# Patient Record
Sex: MALE | Race: WHITE | NOT HISPANIC OR LATINO | URBAN - METROPOLITAN AREA
[De-identification: names, ages, dates, MRNs, and addresses within clinical notes are randomized per-mention and may not be internally consistent; named-entity substitution may affect disease eponyms.]

---

## 2023-04-28 ENCOUNTER — APPOINTMENT (RX ONLY)
Dept: URBAN - METROPOLITAN AREA CLINIC 46 | Facility: CLINIC | Age: 33
Setting detail: DERMATOLOGY
End: 2023-04-28

## 2023-04-28 DIAGNOSIS — L20.89 OTHER ATOPIC DERMATITIS: ICD-10-CM

## 2023-04-28 PROBLEM — L30.9 DERMATITIS, UNSPECIFIED: Status: ACTIVE | Noted: 2023-04-28

## 2023-04-28 PROCEDURE — ? TREATMENT REGIMEN

## 2023-04-28 PROCEDURE — ? COUNSELING

## 2023-04-28 PROCEDURE — ? PRESCRIPTION

## 2023-04-28 PROCEDURE — 99203 OFFICE O/P NEW LOW 30 MIN: CPT

## 2023-04-28 RX ORDER — MOMETASONE FUROATE 1 MG/G
1 OINTMENT TOPICAL BID
Qty: 45 | Refills: 1 | Status: ERX | COMMUNITY
Start: 2023-04-28

## 2023-04-28 RX ADMIN — MOMETASONE FUROATE 1: 1 OINTMENT TOPICAL at 00:00

## 2023-04-28 ASSESSMENT — LOCATION SIMPLE DESCRIPTION DERM
LOCATION SIMPLE: RIGHT FOREARM
LOCATION SIMPLE: LEFT ELBOW

## 2023-04-28 ASSESSMENT — LOCATION DETAILED DESCRIPTION DERM
LOCATION DETAILED: LEFT ELBOW
LOCATION DETAILED: RIGHT PROXIMAL RADIAL DORSAL FOREARM

## 2023-04-28 ASSESSMENT — LOCATION ZONE DERM: LOCATION ZONE: ARM

## 2023-04-28 NOTE — PROCEDURE: TREATMENT REGIMEN
Discontinue Regimen: Triamcinolone ointment - thin layer up to twice daily x 2 weeks per month as needed \\nClobetasol ointment -  thin layer up to twice daily x 2 weeks per month as needed \\n\\nONLY USE THESE 2 WHEN FLARES ARE BAD.
Plan: Patient would like to schedule a patch testing for his own reasoning.  Will send a task to [facility].
Detail Level: Zone
Initiate Treatment: Mometasone ointment Apply a thin layer to affected areas on arms twice daily for 2 weeks then stop x 1 month. May repeat as needed for flares.

## 2023-07-24 ENCOUNTER — APPOINTMENT (RX ONLY)
Dept: URBAN - METROPOLITAN AREA CLINIC 46 | Facility: CLINIC | Age: 33
Setting detail: DERMATOLOGY
End: 2023-07-24

## 2023-07-24 DIAGNOSIS — L259 CONTACT DERMATITIS AND OTHER ECZEMA, UNSPECIFIED CAUSE: ICD-10-CM

## 2023-07-24 PROBLEM — L23.9 ALLERGIC CONTACT DERMATITIS, UNSPECIFIED CAUSE: Status: ACTIVE | Noted: 2023-07-24

## 2023-07-24 PROCEDURE — ? ADDITIONAL NOTES

## 2023-07-24 PROCEDURE — 95044 PATCH/APPLICATION TESTS: CPT

## 2023-07-24 PROCEDURE — ? COUNSELING

## 2023-07-24 PROCEDURE — ? PATCH TESTING

## 2023-07-24 ASSESSMENT — LOCATION SIMPLE DESCRIPTION DERM
LOCATION SIMPLE: UPPER BACK
LOCATION SIMPLE: LEFT UPPER BACK

## 2023-07-24 ASSESSMENT — SEVERITY ASSESSMENT: SEVERITY: MILD

## 2023-07-24 ASSESSMENT — LOCATION DETAILED DESCRIPTION DERM
LOCATION DETAILED: LEFT INFERIOR MEDIAL UPPER BACK
LOCATION DETAILED: INFERIOR THORACIC SPINE

## 2023-07-24 ASSESSMENT — LOCATION ZONE DERM: LOCATION ZONE: TRUNK

## 2023-07-24 NOTE — HPI: TESTING (PATCH TESTING)
Has Your Rash Been Biopsied Before?: has not been biopsied previously
Have You Had Previous Patch Testing In The Past?: none
How Severe Is Your Rash At Its Worst?: mild
Who Is Your Referring Doctor?: Yvonne
Additional History: Patient is here for patch testing.

## 2023-07-24 NOTE — PROCEDURE: PATCH TESTING
Detail Level: None
Post-Care Instructions: I reviewed with the patient in detail post-care instructions. Patient should not sweat, pick at, or get the patches wet for 48 hours.
Consent obtained, risks reviewed including but not limited to rash, itching, allergic reaction, systemic rash, remote possiblity of anaphylaxis to allergen.
Number Of Patches (Maximum Allowable Per Dos By Cms Is 90): 79

## 2023-07-24 NOTE — PROCEDURE: ADDITIONAL NOTES
Render Risk Assessment In Note?: no
Additional Notes: Patient notes recurrent eczematous patches on medial arms and forearms.  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  He also notices flare in this condition when he eats any dairy, so he tries to avoid dairy.\\n\\nDiscussed patch testing and prick testing in detail.  I advise him to see an allergist if food allergy is something he is concerned about, as that is not specifically tested on patch testing.
Detail Level: Simple

## 2023-07-26 ENCOUNTER — APPOINTMENT (RX ONLY)
Dept: URBAN - METROPOLITAN AREA CLINIC 46 | Facility: CLINIC | Age: 33
Setting detail: DERMATOLOGY
End: 2023-07-26

## 2023-07-26 DIAGNOSIS — L259 CONTACT DERMATITIS AND OTHER ECZEMA, UNSPECIFIED CAUSE: ICD-10-CM

## 2023-07-26 PROBLEM — L23.9 ALLERGIC CONTACT DERMATITIS, UNSPECIFIED CAUSE: Status: ACTIVE | Noted: 2023-07-26

## 2023-07-26 PROCEDURE — 99024 POSTOP FOLLOW-UP VISIT: CPT

## 2023-07-26 PROCEDURE — ? ADDITIONAL NOTES

## 2023-07-26 PROCEDURE — ? COUNSELING

## 2023-07-26 PROCEDURE — ? PATCH TEST REMOVAL

## 2023-07-26 PROCEDURE — ? NORTH AMERICAN 80 PATCH TEST READING

## 2023-07-26 ASSESSMENT — LOCATION ZONE DERM: LOCATION ZONE: TRUNK

## 2023-07-26 ASSESSMENT — LOCATION DETAILED DESCRIPTION DERM: LOCATION DETAILED: LEFT INFERIOR MEDIAL UPPER BACK

## 2023-07-26 ASSESSMENT — LOCATION SIMPLE DESCRIPTION DERM: LOCATION SIMPLE: LEFT UPPER BACK

## 2023-07-26 NOTE — HPI: TESTING (PATCH TESTING READING, DISCUSSION OF RESULTS)
How Severe Is Your Rash?: mild
What Patch Testing Have You Undergone?: [employer] 80 Series
What Reading Is This?: 48 hour patch test reading
Additional History: Patient is here to have patches removed today.

## 2023-07-26 NOTE — PROCEDURE: ADDITIONAL NOTES
Render Risk Assessment In Note?: no
Additional Notes: Patient notes recurrent eczematous patches on medial arms and forearms.  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  He also notices flare in this condition when he eats any dairy, so he tries to avoid dairy.\\n\\n(+) PPD today.  This  may be relevant if cardboard he contacts has dark ink on it.  Reviewed handout on PPD with patient today.
Detail Level: Simple

## 2023-07-26 NOTE — PROCEDURE: [employer] 80 PATCH TEST READING
Allergen 80 Reaction: no reaction
Name Of Allergen 62: Polysorbate 80
Name Of Allergen 15: 8-jhgi-gkjgxhgxyxqgcultwctuwi resin
Name Of Allergen 27: Mehtyldibromo glutaronitrile
Name Of Allergen 3: Colophony
Name Of Allergen 50: Fragrance mix II
Name Of Allergen 34: Benzophenone-3
Name Of Allergen 69: Dibucaine
Name Of Allergen 22: Tocopherol
Name Of Allergen 45: Budesonide
Name Of Allergen 10: Thiuram mix
Name Of Allergen 57: Cananga odorata oil (serina smalls)
Allergen 4 Reaction: 1+
Name Of Allergen 29: Glutaraldehyde
Name Of Allergen 64: 2-n-octyl-4-isothiazolin-3-one
What Reading Time Point?: 48 hour
Name Of Allergen 76: Cocamidopropyl betaine
Name Of Allergen 52: Benzyl salicylate
Name Of Allergen 5: Imidazolidinyl urea
Name Of Allergen 17: n, n diphenylguanidine
Allergen 23 Reaction: +/-
Number Of Patches Read: 79
Name Of Allergen 71: Isoamyl-p-methoxycinnamate
Name Of Allergen 36: Ethyleneurea, melamine formaldehyde mix
Name Of Allergen 53: Decyl glucoside
Name Of Allergen 18: Potassium dichromate
Name Of Allergen 6: Cinnamal
Name Of Allergen 41: Toluenesulfonamide formaldehyde resin
Name Of Allergen 65: Disperse blue dye 106/124 mix
Show Allergen Counseling In The Note?: No
Name Of Allergen 72: Hydroxylisohexyl-3-cyclohexenecarboxaldehyde
Name Of Allergen 60: Hydroperoxides of limonene
Name Of Allergen 13: Epoxy resin
Name Of Allergen 25: Disperse Orange 3
Name Of Allergen 37: 2-tert-butyl-4-methoxyphenol (BHA)
Name Of Allergen 1: Benzocaine
Name Of Allergen 48: Textile dye mix
Name Of Allergen 79: Propylene glycol
Name Of Allergen 32: Thimerosal
Name Of Allergen 8: Carba mix
Name Of Allergen 55: HEMA
Name Of Allergen 20: Nickel
Name Of Allergen 67: Lidocaine
Name Of Allergen 43: Cobalt
Name Of Allergen 39: Ethyl acrylate
Name Of Allergen 74: Hydroperoxides of linalool
Name Of Allergen 21: Diazolidinylurea
Name Of Allergen 44: Tixocortol-21-pivalate
Name Of Allergen 56: DMDM hydantoin
Name Of Allergen 9: Neomycin
Name Of Allergen 75: Amidoamine
Name Of Allergen 40: Glyceryl monothioglycolate
Name Of Allergen 16: Mercapto mix
Name Of Allergen 63: Iodopropynyl butyl carbamate
Name Of Allergen 28: Fragrance mix
Name Of Allergen 51: Disperse yellow dye 3
Name Of Allergen 4: PPD
Name Of Allergen 23: Bacitracin
Name Of Allergen 35: Chloroxylenol
Name Of Allergen 70: Benzoylperoxide
Name Of Allergen 58: Benzyl alcohol
Name Of Allergen 46: Coamide FAZAL
Name Of Allergen 11: Clobetasol
Name Of Allergen 77: Formaldehyde
Name Of Allergen 30: 2-bromo-2-nitropropane-1,3-diol (Bronopol)
Show Negative Results In The Note?: Yes
Detail Level: Zone
Name Of Allergen 59: Isopropyl myristate
Name Of Allergen 24: Mixed dialkyl thiourea
Name Of Allergen 47: Triethanolamine
Name Of Allergen 12: Ethylenediamine
Name Of Allergen 31: Sesquiterpene lactone mix
Name Of Allergen 66: Compositae mix II
Name Of Allergen 78: MI/MCI
Name Of Allergen 42: Methyl methacrylate
Name Of Allergen 7: Leonor
Name Of Allergen 54: Methylisothiazolinone
Name Of Allergen 19: Myroxylan pereirae resin (BOP)
Name Of Allergen 61: Desoximetasone
Name Of Allergen 26: Paraben mix
Name Of Allergen 38: Gold
Name Of Allergen 73: Ethylhexyl salicylate
Name Of Allergen 14: Quaternium 15
Name Of Allergen 2: 2 Mercaptobenzothiazole
Name Of Allergen 49: Tea tree oil
Name Of Allergen 80: Oleamidopropyl dimethylamine
Name Of Allergen 68: Fusidic acid sodium salt
Name Of Allergen 33: Propolis

## 2023-07-28 ENCOUNTER — APPOINTMENT (RX ONLY)
Dept: URBAN - METROPOLITAN AREA CLINIC 46 | Facility: CLINIC | Age: 33
Setting detail: DERMATOLOGY
End: 2023-07-28

## 2023-07-28 DIAGNOSIS — L259 CONTACT DERMATITIS AND OTHER ECZEMA, UNSPECIFIED CAUSE: ICD-10-CM

## 2023-07-28 PROBLEM — L23.9 ALLERGIC CONTACT DERMATITIS, UNSPECIFIED CAUSE: Status: ACTIVE | Noted: 2023-07-28

## 2023-07-28 PROBLEM — L23.4 ALLERGIC CONTACT DERMATITIS DUE TO DYES: Status: ACTIVE | Noted: 2023-07-28

## 2023-07-28 PROCEDURE — ? ADDITIONAL NOTES

## 2023-07-28 PROCEDURE — 99212 OFFICE O/P EST SF 10 MIN: CPT

## 2023-07-28 PROCEDURE — ? COUNSELING

## 2023-07-28 PROCEDURE — ? NORTH AMERICAN 80 PATCH TEST READING

## 2023-07-28 ASSESSMENT — LOCATION ZONE DERM: LOCATION ZONE: TRUNK

## 2023-07-28 ASSESSMENT — LOCATION SIMPLE DESCRIPTION DERM: LOCATION SIMPLE: LEFT UPPER BACK

## 2023-07-28 ASSESSMENT — LOCATION DETAILED DESCRIPTION DERM
LOCATION DETAILED: LEFT MID-UPPER BACK
LOCATION DETAILED: LEFT INFERIOR MEDIAL UPPER BACK

## 2023-07-28 NOTE — PROCEDURE: ADDITIONAL NOTES
Render Risk Assessment In Note?: no
Additional Notes: (2+) PPD today\\n(1+) Neomycin today \\n(+/-) bacitracin today\\n(+/-) disperse blue dye 106/124 mixed\\n\\nPatient previously has rash distributed on the right forearm and was using triple-antibiotic which contains neomycin and certain antibiotic eye drops. \\n\\nPatient advised to be cautious of blue or black dyed cloth such as blue jeans, discussed that washing the clothing multiple times may help reduce chance of an allergic reaction. \\n\\nPatient was given handouts for all allergens today.
Detail Level: Simple

## 2023-07-28 NOTE — PROCEDURE: [employer] 80 PATCH TEST READING
Allergen 80 Reaction: no reaction
Name Of Allergen 62: Polysorbate 80
Name Of Allergen 15: 2-hpxp-cotzgxxkanvqjcncwfrjpm resin
Name Of Allergen 27: Mehtyldibromo glutaronitrile
Name Of Allergen 3: Colophony
Name Of Allergen 50: Fragrance mix II
Allergen 9 Reaction: 1+
Name Of Allergen 34: Benzophenone-3
Name Of Allergen 69: Dibucaine
Name Of Allergen 22: Tocopherol
Name Of Allergen 45: Budesonide
Name Of Allergen 10: Thiuram mix
Name Of Allergen 57: Cananga odorata oil (serina smalls)
Allergen 4 Reaction: 2+
Name Of Allergen 29: Glutaraldehyde
Name Of Allergen 64: 2-n-octyl-4-isothiazolin-3-one
What Reading Time Point?: 48 hour
Name Of Allergen 76: Cocamidopropyl betaine
Name Of Allergen 52: Benzyl salicylate
Name Of Allergen 5: Imidazolidinyl urea
Name Of Allergen 17: n, n diphenylguanidine
Allergen 23 Reaction: +/-
Number Of Patches Read: 79
Name Of Allergen 71: Isoamyl-p-methoxycinnamate
Name Of Allergen 36: Ethyleneurea, melamine formaldehyde mix
Name Of Allergen 53: Decyl glucoside
Name Of Allergen 18: Potassium dichromate
Name Of Allergen 6: Cinnamal
Name Of Allergen 41: Toluenesulfonamide formaldehyde resin
Name Of Allergen 65: Disperse blue dye 106/124 mix
Show Allergen Counseling In The Note?: No
Name Of Allergen 72: Hydroxylisohexyl-3-cyclohexenecarboxaldehyde
Name Of Allergen 60: Hydroperoxides of limonene
Name Of Allergen 13: Epoxy resin
Name Of Allergen 25: Disperse Orange 3
Name Of Allergen 37: 2-tert-butyl-4-methoxyphenol (BHA)
Name Of Allergen 1: Benzocaine
Name Of Allergen 48: Textile dye mix
Name Of Allergen 79: Propylene glycol
Name Of Allergen 32: Thimerosal
Name Of Allergen 8: Carba mix
Name Of Allergen 55: HEMA
Name Of Allergen 20: Nickel
Name Of Allergen 67: Lidocaine
Name Of Allergen 43: Cobalt
Name Of Allergen 39: Ethyl acrylate
Name Of Allergen 74: Hydroperoxides of linalool
Name Of Allergen 21: Diazolidinylurea
Name Of Allergen 44: Tixocortol-21-pivalate
Name Of Allergen 56: DMDM hydantoin
Name Of Allergen 9: Neomycin
Name Of Allergen 75: Amidoamine
Name Of Allergen 40: Glyceryl monothioglycolate
Name Of Allergen 16: Mercapto mix
Name Of Allergen 63: Iodopropynyl butyl carbamate
Name Of Allergen 28: Fragrance mix
Name Of Allergen 51: Disperse yellow dye 3
Name Of Allergen 4: PPD
Name Of Allergen 23: Bacitracin
Name Of Allergen 35: Chloroxylenol
Name Of Allergen 70: Benzoylperoxide
Name Of Allergen 58: Benzyl alcohol
Name Of Allergen 46: Coamide FAZAL
Name Of Allergen 11: Clobetasol
Name Of Allergen 77: Formaldehyde
Name Of Allergen 30: 2-bromo-2-nitropropane-1,3-diol (Bronopol)
Show Negative Results In The Note?: Yes
Detail Level: Zone
Name Of Allergen 59: Isopropyl myristate
Name Of Allergen 24: Mixed dialkyl thiourea
Name Of Allergen 47: Triethanolamine
Name Of Allergen 12: Ethylenediamine
Name Of Allergen 31: Sesquiterpene lactone mix
Name Of Allergen 66: Compositae mix II
Name Of Allergen 78: MI/MCI
Name Of Allergen 42: Methyl methacrylate
Name Of Allergen 7: Leonor
Name Of Allergen 54: Methylisothiazolinone
Name Of Allergen 19: Myroxylan pereirae resin (BOP)
Name Of Allergen 61: Desoximetasone
Name Of Allergen 26: Paraben mix
Name Of Allergen 38: Gold
Name Of Allergen 73: Ethylhexyl salicylate
Name Of Allergen 14: Quaternium 15
Name Of Allergen 2: 2 Mercaptobenzothiazole
Name Of Allergen 49: Tea tree oil
Name Of Allergen 80: Oleamidopropyl dimethylamine
Name Of Allergen 68: Fusidic acid sodium salt
Name Of Allergen 33: Propolis

## 2023-08-22 ENCOUNTER — APPOINTMENT (RX ONLY)
Dept: URBAN - METROPOLITAN AREA CLINIC 46 | Facility: CLINIC | Age: 33
Setting detail: DERMATOLOGY
End: 2023-08-22

## 2023-11-03 ENCOUNTER — APPOINTMENT (RX ONLY)
Dept: URBAN - METROPOLITAN AREA CLINIC 46 | Facility: CLINIC | Age: 33
Setting detail: DERMATOLOGY
End: 2023-11-03

## 2023-11-03 DIAGNOSIS — D18.0 HEMANGIOMA: ICD-10-CM

## 2023-11-03 DIAGNOSIS — D22 MELANOCYTIC NEVI: ICD-10-CM

## 2023-11-03 DIAGNOSIS — L81.4 OTHER MELANIN HYPERPIGMENTATION: ICD-10-CM

## 2023-11-03 PROBLEM — D18.01 HEMANGIOMA OF SKIN AND SUBCUTANEOUS TISSUE: Status: ACTIVE | Noted: 2023-11-03

## 2023-11-03 PROBLEM — D22.5 MELANOCYTIC NEVI OF TRUNK: Status: ACTIVE | Noted: 2023-11-03

## 2023-11-03 PROCEDURE — ? COUNSELING

## 2023-11-03 PROCEDURE — 99213 OFFICE O/P EST LOW 20 MIN: CPT

## 2023-11-03 PROCEDURE — ? SUNSCREEN RECOMMENDATIONS

## 2023-11-03 ASSESSMENT — LOCATION DETAILED DESCRIPTION DERM
LOCATION DETAILED: LEFT SUPERIOR MEDIAL UPPER BACK
LOCATION DETAILED: SUPERIOR LUMBAR SPINE
LOCATION DETAILED: RIGHT SUPERIOR MEDIAL MIDBACK

## 2023-11-03 ASSESSMENT — LOCATION SIMPLE DESCRIPTION DERM
LOCATION SIMPLE: RIGHT LOWER BACK
LOCATION SIMPLE: LEFT UPPER BACK
LOCATION SIMPLE: LOWER BACK

## 2023-11-03 ASSESSMENT — LOCATION ZONE DERM: LOCATION ZONE: TRUNK

## 2023-11-03 NOTE — HPI: FULL BODY SKIN EXAMINATION
What Type Of Note Output Would You Prefer (Optional)?: Standard Output
What Is The Reason For Today's Visit?: Annual Full Body Skin Examination with No Concerns
What Is The Reason For Today's Visit? (Being Monitored For X): concerning skin lesions on an annual basis
How Severe Are Your Spot(S)?: mild
Additional History: Patient presents today for a full body skin check and reports no areas of concern at this time. Patient reports melanoma runs on both sides of the family, and he used to be a  and never used sunscreen. Patient reports still not using sunscreen regularly.

## 2024-11-04 ENCOUNTER — APPOINTMENT (RX ONLY)
Dept: URBAN - METROPOLITAN AREA CLINIC 46 | Facility: CLINIC | Age: 34
Setting detail: DERMATOLOGY
End: 2024-11-04

## 2024-11-04 DIAGNOSIS — D18.0 HEMANGIOMA: ICD-10-CM

## 2024-11-04 DIAGNOSIS — D22 MELANOCYTIC NEVI: ICD-10-CM

## 2024-11-04 DIAGNOSIS — L81.4 OTHER MELANIN HYPERPIGMENTATION: ICD-10-CM

## 2024-11-04 DIAGNOSIS — L20.89 OTHER ATOPIC DERMATITIS: ICD-10-CM | Status: STABLE

## 2024-11-04 PROBLEM — D22.5 MELANOCYTIC NEVI OF TRUNK: Status: ACTIVE | Noted: 2024-11-04

## 2024-11-04 PROBLEM — D18.01 HEMANGIOMA OF SKIN AND SUBCUTANEOUS TISSUE: Status: ACTIVE | Noted: 2024-11-04

## 2024-11-04 PROBLEM — L30.9 DERMATITIS, UNSPECIFIED: Status: ACTIVE | Noted: 2024-11-04

## 2024-11-04 PROCEDURE — 99213 OFFICE O/P EST LOW 20 MIN: CPT

## 2024-11-04 PROCEDURE — ? PRESCRIPTION MEDICATION MANAGEMENT

## 2024-11-04 PROCEDURE — ? SUNSCREEN RECOMMENDATIONS

## 2024-11-04 PROCEDURE — ? COUNSELING

## 2024-11-04 ASSESSMENT — LOCATION DETAILED DESCRIPTION DERM
LOCATION DETAILED: LEFT ELBOW
LOCATION DETAILED: SUPERIOR LUMBAR SPINE
LOCATION DETAILED: RIGHT SUPERIOR MEDIAL MIDBACK
LOCATION DETAILED: RIGHT PROXIMAL RADIAL DORSAL FOREARM
LOCATION DETAILED: LEFT SUPERIOR MEDIAL UPPER BACK

## 2024-11-04 ASSESSMENT — LOCATION ZONE DERM
LOCATION ZONE: TRUNK
LOCATION ZONE: ARM

## 2024-11-04 ASSESSMENT — LOCATION SIMPLE DESCRIPTION DERM
LOCATION SIMPLE: LOWER BACK
LOCATION SIMPLE: RIGHT LOWER BACK
LOCATION SIMPLE: RIGHT FOREARM
LOCATION SIMPLE: LEFT ELBOW
LOCATION SIMPLE: LEFT UPPER BACK

## 2024-11-04 ASSESSMENT — ITCH NUMERIC RATING SCALE: HOW SEVERE IS YOUR ITCHING?: 2

## 2024-11-04 ASSESSMENT — SEVERITY ASSESSMENT 2020: SEVERITY 2020: MILD

## 2024-11-04 ASSESSMENT — BSA ECZEMA: % BODY COVERED IN ECZEMA: 1

## 2024-11-04 NOTE — PROCEDURE: PRESCRIPTION MEDICATION MANAGEMENT
Render In Strict Bullet Format?: No
Detail Level: Zone
Plan: Condition looks more like psoriasis today. Pt stated it has been a lot better since he stop doing dairy products. Pt denies any joint, nail pain today. \\n\\nWill give samples of duobrii today to see if it helps with itching and inflammation. Pt went to see an allergist and was advised to mix athlete foot cream with mometasone which it works well keeping condition well controlled until recently when he ate cheese and had a flare up. \\n\\nF/U PRN
Samples Given: Duobrii X2
Continue Regimen: mometasone 0.1 % topical ointment TP. Apply a thin layer to affected areas on elbow twice daily for 2 weeks then stop x 1 month. May repeat as needed for flares.

## 2024-11-04 NOTE — HPI: FULL BODY SKIN EXAMINATION
What Type Of Note Output Would You Prefer (Optional)?: Bullet Format
What Is The Reason For Today's Visit?: Full Body Skin Examination
What Is The Reason For Today's Visit? (Being Monitored For X): concerning skin lesions on an annual basis
Additional History: Pt is here for FBSE with no concerns today. No Hx of BCC, SCC or melanoma.
[phone]